# Patient Record
Sex: FEMALE | Race: WHITE
[De-identification: names, ages, dates, MRNs, and addresses within clinical notes are randomized per-mention and may not be internally consistent; named-entity substitution may affect disease eponyms.]

---

## 2018-01-21 ENCOUNTER — HOSPITAL ENCOUNTER (EMERGENCY)
Dept: HOSPITAL 56 - MW.ED | Age: 33
Discharge: PSYCHIATRIC OR SUBSTANCE USE FACILITY | End: 2018-01-21
Payer: COMMERCIAL

## 2018-01-21 DIAGNOSIS — F32.9: Primary | ICD-10-CM

## 2018-01-21 LAB
APAP SERPL-MCNC: < 3 UG/ML
CHLORIDE SERPL-SCNC: 104 MMOL/L (ref 98–110)
SODIUM SERPL-SCNC: 139 MMOL/L (ref 136–146)

## 2018-01-21 PROCEDURE — 85025 COMPLETE CBC W/AUTO DIFF WBC: CPT

## 2018-01-21 PROCEDURE — 81025 URINE PREGNANCY TEST: CPT

## 2018-01-21 PROCEDURE — 82553 CREATINE MB FRACTION: CPT

## 2018-01-21 PROCEDURE — 83735 ASSAY OF MAGNESIUM: CPT

## 2018-01-21 PROCEDURE — 84484 ASSAY OF TROPONIN QUANT: CPT

## 2018-01-21 PROCEDURE — 84443 ASSAY THYROID STIM HORMONE: CPT

## 2018-01-21 PROCEDURE — 93005 ELECTROCARDIOGRAM TRACING: CPT

## 2018-01-21 PROCEDURE — 80053 COMPREHEN METABOLIC PANEL: CPT

## 2018-01-21 PROCEDURE — 84481 FREE ASSAY (FT-3): CPT

## 2018-01-21 PROCEDURE — 81003 URINALYSIS AUTO W/O SCOPE: CPT

## 2018-01-21 PROCEDURE — G0480 DRUG TEST DEF 1-7 CLASSES: HCPCS

## 2018-01-21 PROCEDURE — 99285 EMERGENCY DEPT VISIT HI MDM: CPT

## 2018-01-21 PROCEDURE — 80305 DRUG TEST PRSMV DIR OPT OBS: CPT

## 2018-01-21 NOTE — EDM.PDOC
ED HPI GENERAL MEDICAL PROBLEM





- General


Stated Complaint: DEPRESSION


Time Seen by Provider: 01/21/18 00:39





- History of Present Illness


INITIAL COMMENTS - FREE TEXT/NARRATIVE: 


HISTORY AND PHYSICAL:





History of present illness:


Patient 32-year-old female history of depression as never been involved with 

psychiatry overmedicated presents with a concern of depressive episode tonight 

which he doesn't feel safe at home she does not have a particular plan she just 

feels overwhelmed she is cooperative [place] on arrival and denies any medical 

history





Review of systems: 


As per history of present illness and below otherwise all systems reviewed and 

negative.





Past medical history: 


As per history of present illness and as reviewed below otherwise 

noncontributory.





Surgical history: 


As per history of present illness and as reviewed below otherwise 

noncontributory.





Social history: 


No reported history of drug or alcohol abuse.





Family history: 


As per history of present illness and as reviewed below otherwise 

noncontributory.





Physical exam:


HEENT: Atraumatic, normocephalic, pupils reactive, negative for conjunctival 

pallor or scleral icterus, mucous membranes moist, throat clear, neck supple, 

nontender, trachea midline.


Lungs: Clear to auscultation, breath sounds equal bilaterally, chest nontender.


Heart: S1S2, regular, negative for clicks, rubs, or JVD.


Abdomen: Soft, nondistended, nontender. Negative for masses or 

hepatosplenomegaly. Negative for costovertebral tenderness.


Pelvis: Stable nontender.


Genitourinary: Deferred.


Rectal: Deferred.


Extremities: Atraumatic, negative for cords or calf pain. Neurovascular 

unremarkable.


Neuro: Awake, alert, oriented. Cranial nerves II through XII unremarkable. 

Cerebellum unremarkable. Motor and sensory unremarkable throughout. Exam 

nonfocal.





Diagnostics:


Psychiatric panel





Therapeutics:


None





Impression: 


#1 major depressive episode





Definitive disposition and diagnosis as appropriate pending reevaluation and 

review of above.








ED ROS GENERAL





- Review of Systems


Review Of Systems: ROS reveals no pertinent complaints other than HPI.





ED EXAM, GENERAL





- Physical Exam


Exam: See Below (See dictation)





Course





- Orders/Labs/Meds


Orders: 





 Active Orders 24 hr











 Category Date Time Status


 


 EKG 12 Lead [EKG Documentation Completion] [RC] STAT Care  01/21/18 00:27 

Active


 


 ACETAMINOPHEN [CHEM] Stat Lab  01/21/18 00:27 Ordered


 


 CBC WITH AUTO DIFF [HEME] Stat Lab  01/21/18 00:27 Ordered


 


 CKMB [CHEM] Stat Lab  01/21/18 00:27 Ordered


 


 COMPREHENSIVE METABOLIC PN,CMP [CHEM] Stat Lab  01/21/18 00:27 Ordered


 


 DRUG SCREEN, URINE [URCHEM] Stat Lab  01/21/18 00:27 Uncollected


 


 FREE T3 [REF] Stat Lab  01/21/18 00:27 Ordered


 


 HCG QUALITATIVE,URINE [URCHEM] Stat Lab  01/21/18 00:27 Uncollected


 


 MAGNESIUM [CHEM] Stat Lab  01/21/18 00:27 Ordered


 


 SALICYLATE [CHEM] Stat Lab  01/21/18 00:27 Ordered


 


 TROPONIN I [CHEM] Stat Lab  01/21/18 00:27 Ordered


 


 TSH [CHEM] Stat Lab  01/21/18 00:27 Ordered


 


 UA W/MICROSCOPIC [URIN] Stat Lab  01/21/18 00:27 Uncollected














Departure





- Departure


Time of Disposition: 00:39


Disposition: DC/[facility]fer to [facility]


Condition: Good


Clinical Impression: 


 Depression








- Discharge Information





- My Orders


Last 24 Hours: 





My Active Orders





01/21/18 00:27


EKG 12 Lead [EKG Documentation Completion] [RC] STAT 


ACETAMINOPHEN [CHEM] Stat 


CBC WITH AUTO DIFF [HEME] Stat 


CKMB [CHEM] Stat 


COMPREHENSIVE METABOLIC PN,CMP [CHEM] Stat 


DRUG SCREEN, URINE [URCHEM] Stat 


FREE T3 [REF] Stat 


HCG QUALITATIVE,URINE [URCHEM] Stat 


MAGNESIUM [CHEM] Stat 


SALICYLATE [CHEM] Stat 


TROPONIN I [CHEM] Stat 


TSH [CHEM] Stat 


UA W/MICROSCOPIC [URIN] Stat 














- Assessment/Plan


Last 24 Hours: 





My Active Orders





01/21/18 00:27


EKG 12 Lead [EKG Documentation Completion] [RC] STAT 


ACETAMINOPHEN [CHEM] Stat 


CBC WITH AUTO DIFF [HEME] Stat 


CKMB [CHEM] Stat 


COMPREHENSIVE METABOLIC PN,CMP [CHEM] Stat 


DRUG SCREEN, URINE [URCHEM] Stat 


FREE T3 [REF] Stat 


HCG QUALITATIVE,URINE [URCHEM] Stat 


MAGNESIUM [CHEM] Stat 


SALICYLATE [CHEM] Stat 


TROPONIN I [CHEM] Stat 


TSH [CHEM] Stat 


UA W/MICROSCOPIC [URIN] Stat